# Patient Record
Sex: FEMALE | Employment: OTHER | URBAN - METROPOLITAN AREA
[De-identification: names, ages, dates, MRNs, and addresses within clinical notes are randomized per-mention and may not be internally consistent; named-entity substitution may affect disease eponyms.]

---

## 2017-06-09 NOTE — PATIENT INSTRUCTIONS
ANII: LUCIO WILL BE OUT OF THE CLINIC FROM JAZZ 15 THROUGH JULY 4.  Any calls/Mychart messages, refill requests, and urgent lab results will be forwarded to her colleagues in her absence.

## 2017-06-12 ENCOUNTER — TELEPHONE (OUTPATIENT)
Dept: FAMILY MEDICINE | Facility: CLINIC | Age: 47
End: 2017-06-12

## 2017-06-12 ENCOUNTER — OFFICE VISIT (OUTPATIENT)
Dept: FAMILY MEDICINE | Facility: CLINIC | Age: 47
End: 2017-06-12
Payer: COMMERCIAL

## 2017-06-12 DIAGNOSIS — M54.41 ACUTE MIDLINE LOW BACK PAIN WITH BILATERAL SCIATICA: Primary | ICD-10-CM

## 2017-06-12 DIAGNOSIS — M54.2 CERVICALGIA: ICD-10-CM

## 2017-06-12 DIAGNOSIS — M54.12 CERVICAL RADICULOPATHY: ICD-10-CM

## 2017-06-12 DIAGNOSIS — M54.42 ACUTE MIDLINE LOW BACK PAIN WITH BILATERAL SCIATICA: Primary | ICD-10-CM

## 2017-06-12 PROCEDURE — 99213 OFFICE O/P EST LOW 20 MIN: CPT | Performed by: PHYSICIAN ASSISTANT

## 2017-06-12 NOTE — MR AVS SNAPSHOT
After Visit Summary   6/12/2017    Roya Feliciano    MRN: [id number]           Patient Information     Date Of Birth          1970        Visit Information        Provider Department      6/12/2017 12:00 PM Wanda Rothman PA-C [facility]        Today's Diagnoses     Acute midline low back pain with bilateral sciatica    -  1    Cervicalgia        Cervical radiculopathy          Care Instructions    FYI: WANDA WILL BE OUT OF THE CLINIC FROM JAZZ 15 THROUGH JULY 4.  Any calls/Mychart messages, refill requests, and urgent lab results will be forwarded to her colleagues in her absence.             Follow-ups after your visit        Additional Services     [facility] PT, HAND, AND CHIROPRACTIC REFERRAL       **This order will print in the [facility] Scheduling Office**    Physical Therapy, Hand Therapy and Chiropractic Care are available through:    *[facility]  *[facility]  *[facility]    Call one number to schedule at any of the above locations: [phone].    Your provider has referred you to: Physical Therapy at [facility] or [facility]    Indication/Reason for Referral: Neck and low back pain with radiculopathy  Onset of Illness: 1-2 years  Therapy Orders: Evaluate and Treat  Special Programs: None  Special Request: None    Frantz Landry      Additional Comments for the Therapist or Chiropractor:     Please be aware that coverage of these services is subject to the terms and limitations of your health insurance plan.  Call member services at your health plan with any benefit or coverage questions.      Please bring the following to your appointment:    *Your personal calendar for scheduling future appointments  *Comfortable clothing                  Future tests that were ordered for you today     Open Future Orders        Priority Expected Expires Ordered    XR Lumbar Spine 2/3 Views Routine 6/12/2017 6/12/2018 6/12/2017    XR Cervical Spine  2/3 Views Routine 6/12/2017 6/12/2018 6/12/2017            Who to contact     Normal or non-critical lab and imaging results will be communicated to you by thesixtyonehart, letter or phone within 4 business days after the clinic has received the results. If you do not hear from us within 7 days, please contact the clinic through thesixtyonehart or phone. If you have a critical or abnormal lab result, we will notify you by phone as soon as possible.  Submit refill requests through StyleHop or call your pharmacy and they will forward the refill request to us. Please allow 3 business days for your refill to be completed.          If you need to speak with a  for additional information , please call: [phone]             Additional Information About Your Visit        StyleHop Information     StyleHop gives you secure access to your electronic health record. If you see a primary care provider, you can also send messages to your care team and make appointments. If you have questions, please call your primary care clinic.  If you do not have a primary care provider, please call [phone] and they will assist you.        Care EveryWhere ID     This is your Care EveryWhere ID. This could be used by other organizations to access your [place] medical records  [id number]         Blood Pressure from Last 3 Encounters:   05/20/16 132/83   05/13/16 123/81   01/07/16 126/76    Weight from Last 3 Encounters:   05/20/16 142 lb 3.2 oz (64.5 kg)   05/13/16 142 lb 9.6 oz (64.7 kg)   01/07/16 142 lb 5.1 oz (64.6 kg)              We Performed the Following     [facility] PT, HAND, AND CHIROPRACTIC REFERRAL        Primary Care Provider Office Phone # Fax #    Wanda Rothman PA-C [phone]       [facility] [place] [address] [place] [address]        Thank you!     [facility]. Our goal is always to provide you with excellent care. Hearing back from our patients  is one way we can continue to improve our services. Please take a few minutes to complete the written survey that you may receive in the mail after your visit with us. Thank you!             Your Updated Medication List - Protect others around you: Learn how to safely use, store and throw away your medicines at www.disposemymeds.org.          This list is accurate as of: 6/12/17  2:39 PM.  Always use your most recent med list.                   Brand Name Dispense Instructions for use    amitriptyline 10 MG tablet    ELAVIL    90 tablet    Take 1 tablet (10 mg) by mouth daily       cetirizine 10 MG tablet    zyrTEC    90 tablet    Take 1 tablet (10 mg) by mouth every evening       fluticasone 50 MCG/ACT spray    FLONASE    16 g    Spray 1-2 sprays into both nostrils daily       predniSONE 20 MG tablet    DELTASONE    20 tablet    Take 3 tabs (60 mg) orally daily for 3 days, 2 tabs (40 mg) orally daily for 3 days, 1 tab (20 mg) orally daily for 3 days, then 1/2 tab (10 mg) orally for 3 days

## 2017-06-12 NOTE — Clinical Note
Upon chart review, patient has not yet done x-rays or physical therapy. Will start with these things before obtaining MRIs. Orders/referral placed. Please give patient info to schedule physical therapy and have her come back for XR. Wanda

## 2017-06-12 NOTE — TELEPHONE ENCOUNTER
Upon chart review, patient has not yet done x-rays or physical therapy. Will start with these things before obtaining MRIs. Orders/referral placed. Please give patient info to schedule physical therapy and have her come back for XR.     Left message for patient to call back pod 2 line.([phone])

## 2017-06-12 NOTE — LETTER
[facility] [place]  [address] [place] [id number] [phone]        July 11, 2017    Roya Feliciano  [facility] [place] [address]              Dear Roya Feliciano    After many unsuccessful attempts at reaching you by phone we are writing to inform you that upon chart review, you have not yet completed x-rays or physical therapy. We will start with these things before obtaining an MRI. Orders and referral placed. You can call [phone] to schedule an x-ray. To start physical therapy you may call [phone]. If you have any questions or concerns you can reach me at [phone].       Sincerely,        Wanda Rothman's care team

## 2017-06-23 NOTE — TELEPHONE ENCOUNTER
Left message for patient to call back pod 2 line.([phone]). Will postpone x1 week and if patient does not return call will send letter with below info.

## 2017-08-17 ENCOUNTER — RADIANT APPOINTMENT (OUTPATIENT)
Dept: GENERAL RADIOLOGY | Facility: CLINIC | Age: 47
End: 2017-08-17
Attending: PHYSICIAN ASSISTANT
Payer: COMMERCIAL

## 2017-08-17 ENCOUNTER — OFFICE VISIT (OUTPATIENT)
Dept: FAMILY MEDICINE | Facility: CLINIC | Age: 47
End: 2017-08-17
Payer: COMMERCIAL

## 2017-08-17 VITALS
WEIGHT: 135.2 LBS | HEIGHT: 65 IN | BODY MASS INDEX: 22.53 KG/M2 | SYSTOLIC BLOOD PRESSURE: 123 MMHG | OXYGEN SATURATION: 100 % | DIASTOLIC BLOOD PRESSURE: 79 MMHG | TEMPERATURE: 98.8 F | HEART RATE: 71 BPM

## 2017-08-17 DIAGNOSIS — M54.42 ACUTE MIDLINE LOW BACK PAIN WITH BILATERAL SCIATICA: ICD-10-CM

## 2017-08-17 DIAGNOSIS — Z91.09 ENVIRONMENTAL ALLERGIES: ICD-10-CM

## 2017-08-17 DIAGNOSIS — M54.2 CERVICALGIA: ICD-10-CM

## 2017-08-17 DIAGNOSIS — M54.41 ACUTE MIDLINE LOW BACK PAIN WITH BILATERAL SCIATICA: ICD-10-CM

## 2017-08-17 DIAGNOSIS — M54.12 CERVICAL RADICULOPATHY: ICD-10-CM

## 2017-08-17 DIAGNOSIS — M54.12 CERVICAL RADICULOPATHY: Primary | ICD-10-CM

## 2017-08-17 PROCEDURE — 99213 OFFICE O/P EST LOW 20 MIN: CPT | Performed by: PHYSICIAN ASSISTANT

## 2017-08-17 PROCEDURE — 72100 X-RAY EXAM L-S SPINE 2/3 VWS: CPT

## 2017-08-17 PROCEDURE — 72040 X-RAY EXAM NECK SPINE 2-3 VW: CPT

## 2017-08-17 RX ORDER — PREDNISONE 20 MG/1
TABLET ORAL
Qty: 11 TABLET | Refills: 0 | Status: SHIPPED | OUTPATIENT
Start: 2017-08-17 | End: 2017-08-30

## 2017-08-17 RX ORDER — CETIRIZINE HYDROCHLORIDE 10 MG/1
10 TABLET ORAL EVERY EVENING
Qty: 90 TABLET | Refills: 3 | Status: SHIPPED | OUTPATIENT
Start: 2017-08-17 | End: 2019-03-08

## 2017-08-17 NOTE — NURSING NOTE
"Chief Complaint   Patient presents with     Back Pain     right side, would like medication       Initial /79 (Cuff Size: Adult Regular)  Pulse 71  Temp 98.8  F (37.1  C) (Oral)  Ht 5' 5\" (1.651 m)  Wt 135 lb 3.2 oz (61.3 kg)  LMP 08/01/2017 (Approximate)  SpO2 100%  Breastfeeding? No  BMI 22.5 kg/m2 Estimated body mass index is 22.5 kg/(m^2) as calculated from the following:    Height as of this encounter: 5' 5\" (1.651 m).    Weight as of this encounter: 135 lb 3.2 oz (61.3 kg).  Medication Reconciliation: complete   Estelita MONAHAN CMA ([facility])      "

## 2017-08-17 NOTE — PROGRESS NOTES
SUBJECTIVE:                                                    Roya Feliciano is a 46 year old female who presents to clinic today for the following health issues:      Arm Pain       Duration: 3 days ago        Specific cause: none    Description:   Location of pain: whole right side and arm  Character of pain: sharp and hard to move  Pain radiation:down right leg at times  New numbness or weakness in legs, not attributed to pain:  no     Intensity: Currently 7-8/10    History:   Pain interferes with job: YES  History of back problems: sciatica pain  Any previous MRI or X-rays: None  Sees a specialist for back pain:  Patient went to PT  Therapies tried without relief: Physical Therapy    Alleviating factors:   Improved by: medication she receives      Precipitating factors:  Worsened by: Lifting and using arm to do chores    Functional and Psychosocial Screen (Frantz STarT Back):      Not performed today          Accompanying Signs & Symptoms:  Risk of Fracture:  None  Risk of Cauda Equina:  None  Risk of Infection:  None  Risk of Cancer:  None  Risk of Ankylosing Spondylitis:  Onset at age <35, male, AND morning back stiffness. no           Problem list and histories reviewed & adjusted, as indicated.  Additional history: as documented    Patient Active Problem List   Diagnosis     Subclinical hyperthyroidism     Thyroid nodule     CARDIOVASCULAR SCREENING; LDL GOAL LESS THAN 160     Fibroadenoma of breast     Environmental allergies     Non-toxic multinodular goiter     Past Surgical History:   Procedure Laterality Date     C  DELIVERY ONLY      x2       Social History   Substance Use Topics     Smoking status: Never Smoker     Smokeless tobacco: Never Used     Alcohol use No     Family History   Problem Relation Age of Onset     Family History Negative Other              Reviewed and updated as needed this visit by clinical staffTobacco  Allergies  Meds  Med Hx  Surg Hx  Fam Hx  Soc Hx     "  Reviewed and updated as needed this visit by Provider         ROS:  Constitutional, neuro, musculoskeletal, integument and psychiatric systems are negative, except as otherwise noted.      OBJECTIVE:                                                    /79 (Cuff Size: Adult Regular)  Pulse 71  Temp 98.8  F (37.1  C) (Oral)  Ht 5' 5\" (1.651 m)  Wt 135 lb 3.2 oz (61.3 kg)  LMP 08/01/2017 (Approximate)  SpO2 100%  Breastfeeding? No  BMI 22.5 kg/m2  Body mass index is 22.5 kg/(m^2).  GENERAL APPEARANCE: healthy, alert and no distress  MS: extremities normal- no gross deformities noted  NEURO: Normal strength and tone  PSYCH: mentation appears normal and affect normal/bright       ASSESSMENT:                                                      1. Cervical radiculopathy    2. Environmental allergies         PLAN:                                                    Prednisone B&T for cervical radiculopathy. She has completed physical therapy in the past with no change. X-rays today. Follow up if symptoms fail to improve or worsen.     The patient was in agreement with the plan today and had no questions or concerns prior to leaving the clinic.     Wanda Rothman PA-C  [facility] [place]    "

## 2017-08-17 NOTE — MR AVS SNAPSHOT
"              After Visit Summary   8/17/2017    Roya Feliciano    MRN: [id number]           Patient Information     Date Of Birth          1970        Visit Information        Provider Department      8/17/2017 12:00 PM Wanda Rothman PA-C [facility]        Today's Diagnoses     Cervical radiculopathy    -  1    Environmental allergies          Care Instructions    Discharged by Estelita MONAHAN CMA ([facility])            Follow-ups after your visit        Who to contact     Normal or non-critical lab and imaging results will be communicated to you by BrandBackerhart, letter or phone within 4 business days after the clinic has received the results. If you do not hear from us within 7 days, please contact the clinic through BrandBackerhart or phone. If you have a critical or abnormal lab result, we will notify you by phone as soon as possible.  Submit refill requests through Business Insider or call your pharmacy and they will forward the refill request to us. Please allow 3 business days for your refill to be completed.          If you need to speak with a  for additional information , please call: [phone]             Additional Information About Your Visit        MyCharVendobots Information     Business Insider gives you secure access to your electronic health record. If you see a primary care provider, you can also send messages to your care team and make appointments. If you have questions, please call your primary care clinic.  If you do not have a primary care provider, please call [phone] and they will assist you.        Care EveryWhere ID     This is your Care EveryWhere ID. This could be used by other organizations to access your [place] medical records  [id number]        Your Vitals Were     Pulse Temperature Height Last Period Pulse Oximetry Breastfeeding?    71 98.8  F (37.1  C) (Oral) 5' 5\" (1.651 m) 08/01/2017 (Approximate) 100% No    BMI (Body Mass Index)                   22.5 kg/m2            " Blood Pressure from Last 3 Encounters:   08/17/17 123/79   05/20/16 132/83   05/13/16 123/81    Weight from Last 3 Encounters:   08/17/17 135 lb 3.2 oz (61.3 kg)   05/20/16 142 lb 3.2 oz (64.5 kg)   05/13/16 142 lb 9.6 oz (64.7 kg)              Today, you had the following     No orders found for display         Today's Medication Changes          These changes are accurate as of: 8/17/17 12:17 PM.  If you have any questions, ask your nurse or doctor.               Start taking these medicines.        Dose/Directions    predniSONE 20 MG tablet   Commonly known as:  DELTASONE   Used for:  Cervical radiculopathy   Started by:  Wanda Rothman PA-C        Take 2 tabs (40 mg) daily x 3 days, 1 tab (20 mg) daily x 3 days, then 1/2 tab (10 mg) x 4 days.   Quantity:  11 tablet   Refills:  0            Where to get your medicines      These medications were sent to [facility] [place] [facility] [address] [address] [address]     Phone:  [phone]     cetirizine 10 MG tablet    predniSONE 20 MG tablet                Primary Care Provider Office Phone # Fax #    Wanda Rothman PA-C [phone] [address]  [place] [address]        Equal Access to Services     [facility] AH: Hadii aad ku hadasho Soomaali, waaxda luqadaha, qaybta kaalmada adeegyada, waxay meera haytanmay emmanuel . So [facility] [phone].    ATENCIÓN: Si habla español, tiene a jaeger disposición servicios gratuitos de asistencia lingüística. Llame al [phone].    We comply with applicable federal civil rights laws and [place] laws. We do not discriminate on the basis of race, color, national origin, age, disability sex, sexual orientation or gender identity.            Thank you!     [facility]. Our goal is always to provide you with excellent care. Hearing back from our patients is one way we can continue to  improve our services. Please take a few minutes to complete the written survey that you may receive in the mail after your visit with us. Thank you!             Your Updated Medication List - Protect others around you: Learn how to safely use, store and throw away your medicines at www.disposemymeds.org.          This list is accurate as of: 8/17/17 12:17 PM.  Always use your most recent med list.                   Brand Name Dispense Instructions for use Diagnosis    amitriptyline 10 MG tablet    ELAVIL    90 tablet    Take 1 tablet (10 mg) by mouth daily    Myalgia       cetirizine 10 MG tablet    zyrTEC    90 tablet    Take 1 tablet (10 mg) by mouth every evening    Environmental allergies       fluticasone 50 MCG/ACT spray    FLONASE    16 g    Spray 1-2 sprays into both nostrils daily    Seasonal allergic rhinitis       predniSONE 20 MG tablet    DELTASONE    11 tablet    Take 2 tabs (40 mg) daily x 3 days, 1 tab (20 mg) daily x 3 days, then 1/2 tab (10 mg) x 4 days.    Cervical radiculopathy

## 2017-08-21 ENCOUNTER — OFFICE VISIT (OUTPATIENT)
Dept: FAMILY MEDICINE | Facility: CLINIC | Age: 47
End: 2017-08-21
Payer: COMMERCIAL

## 2017-08-21 VITALS
SYSTOLIC BLOOD PRESSURE: 119 MMHG | HEART RATE: 94 BPM | BODY MASS INDEX: 22.53 KG/M2 | DIASTOLIC BLOOD PRESSURE: 75 MMHG | WEIGHT: 135.4 LBS | TEMPERATURE: 97.6 F

## 2017-08-21 DIAGNOSIS — M54.12 BRACHIAL NEURITIS OR RADICULITIS: Primary | ICD-10-CM

## 2017-08-21 PROCEDURE — 99213 OFFICE O/P EST LOW 20 MIN: CPT | Performed by: PHYSICIAN ASSISTANT

## 2017-08-21 RX ORDER — CYCLOBENZAPRINE HCL 10 MG
5-10 TABLET ORAL 3 TIMES DAILY PRN
Qty: 30 TABLET | Refills: 1 | Status: SHIPPED | OUTPATIENT
Start: 2017-08-21 | End: 2017-08-30

## 2017-08-21 RX ORDER — PREDNISONE 20 MG/1
TABLET ORAL
Qty: 11 TABLET | Refills: 0 | Status: SHIPPED | OUTPATIENT
Start: 2017-08-21 | End: 2017-09-07

## 2017-08-21 NOTE — PATIENT INSTRUCTIONS
Take 2 tabs of prednisone through Friday then decrease the dose to 1 tab daily x4 days, then a half tab daily x4 days.  You can use a half tab of the muscle relaxer in the morning and/or afternoon.  Take 1 tab before bed x1 week.    Call me if no improvements in 1-2 weeks and I'll have you see ortho.

## 2017-08-21 NOTE — PROGRESS NOTES
SUBJECTIVE:   Roya Feliciano is a 46 year old female who presents to clinic today for the following health issues:        Musculoskeletal problem/pain      Duration: 7-8 days     Description  Location: right arm     Intensity:  severe    Accompanying signs and symptoms: none    History  Previous similar problem: no   Previous evaluation:  x-ray    Precipitating or alleviating factors:  Trauma or overuse: no   Aggravating factors include: overuse    Therapies tried and outcome: heat and ice    Was better than days she was taking 40mg prednisone, then when she dropped to 20mg the pain returned          Problem list and histories reviewed & adjusted, as indicated.  Additional history: as documented    Patient Active Problem List   Diagnosis     Subclinical hyperthyroidism     Thyroid nodule     CARDIOVASCULAR SCREENING; LDL GOAL LESS THAN 160     Fibroadenoma of breast     Environmental allergies     Non-toxic multinodular goiter     Past Surgical History:   Procedure Laterality Date     C  DELIVERY ONLY      x2       Social History   Substance Use Topics     Smoking status: Never Smoker     Smokeless tobacco: Never Used     Alcohol use No     Family History   Problem Relation Age of Onset     Family History Negative Other              Reviewed and updated as needed this visit by clinical staffTobacco  Allergies  Meds       Reviewed and updated as needed this visit by Provider         ROS:  Constitutional, neuro, musculoskeletal, integument systems are negative, except as otherwise noted.      OBJECTIVE:                                                    /75  Pulse 94  Temp 97.6  F (36.4  C) (Oral)  Wt 135 lb 6.4 oz (61.4 kg)  LMP 2017 (Approximate)  BMI 22.53 kg/m2  Body mass index is 22.53 kg/(m^2).  GENERAL APPEARANCE: healthy, alert and no distress  MS: extremities normal- no gross deformities noted and return of symptoms with compression of the right brachial plexus  NEURO: Normal  strength and tone  PSYCH: mentation appears normal and affect normal/bright       ASSESSMENT:                                                      1. Brachial neuritis or radiculitis         PLAN:                                                    Will increase her prednisone back to 40mg for a few more days. Will also add a muscle relaxer.     Patient Instructions   Take 2 tabs of prednisone through Friday then decrease the dose to 1 tab daily x4 days, then a half tab daily x4 days.  You can use a half tab of the muscle relaxer in the morning and/or afternoon.  Take 1 tab before bed x1 week.    Call me if no improvements in 1-2 weeks and I'll have you see ortho.       The patient was in agreement with the plan today and had no questions or concerns prior to leaving the clinic.     Wanda Rothman PA-C  [facility]

## 2017-08-21 NOTE — NURSING NOTE
"Chief Complaint   Patient presents with     Back Pain     Sciatica pain       Initial /75  Pulse 94  Temp 97.6  F (36.4  C) (Oral)  Wt 135 lb 6.4 oz (61.4 kg)  LMP 08/01/2017 (Approximate)  BMI 22.53 kg/m2 Estimated body mass index is 22.53 kg/(m^2) as calculated from the following:    Height as of 8/17/17: 5' 5\" (1.651 m).    Weight as of this encounter: 135 lb 6.4 oz (61.4 kg).  Medication Reconciliation: complete       Sri Palafox CMA      "

## 2017-08-21 NOTE — MR AVS SNAPSHOT
After Visit Summary   8/21/2017    Roya Feliciano    MRN: [id number]           Patient Information     Date Of Birth          1970        Visit Information        Provider Department      8/21/2017 10:20 AM Wanda Rothman PA-C [facility]        Today's Diagnoses     Brachial neuritis or radiculitis    -  1      Care Instructions    Take 2 tabs of prednisone through Friday then decrease the dose to 1 tab daily x4 days, then a half tab daily x4 days.  You can use a half tab of the muscle relaxer in the morning and/or afternoon.  Take 1 tab before bed x1 week.    Call me if no improvements in 1-2 weeks and I'll have you see ortho.          Follow-ups after your visit        Who to contact     Normal or non-critical lab and imaging results will be communicated to you by [employer], letter or phone within 4 business days after the clinic has received the results. If you do not hear from us within 7 days, please contact the clinic through [employer] or phone. If you have a critical or abnormal lab result, we will notify you by phone as soon as possible.  Submit refill requests through [employer] or call your pharmacy and they will forward the refill request to us. Please allow 3 business days for your refill to be completed.          If you need to speak with a  for additional information , please call: [phone]             Additional Information About Your Visit        [employer] Smart[employer] Physical Information     [employer] gives you secure access to your electronic health record. If you see a primary care provider, you can also send messages to your care team and make appointments. If you have questions, please call your primary care clinic.  If you do not have a primary care provider, please call [phone] and they will assist you.        Care EveryWhere ID     This is your Care EveryWhere ID. This could be used by other organizations to access your [facility]  records  [id number]        Your Vitals Were     Pulse Temperature Last Period BMI (Body Mass Index)          94 97.6  F (36.4  C) (Oral) 08/01/2017 (Approximate) 22.53 kg/m2         Blood Pressure from Last 3 Encounters:   08/21/17 119/75   08/17/17 123/79   05/20/16 132/83    Weight from Last 3 Encounters:   08/21/17 135 lb 6.4 oz (61.4 kg)   08/17/17 135 lb 3.2 oz (61.3 kg)   05/20/16 142 lb 3.2 oz (64.5 kg)              Today, you had the following     No orders found for display         Today's Medication Changes          These changes are accurate as of: 8/21/17 10:37 AM.  If you have any questions, ask your nurse or doctor.               Start taking these medicines.        Dose/Directions    cyclobenzaprine 10 MG tablet   Commonly known as:  FLEXERIL   Used for:  Brachial neuritis or radiculitis   Started by:  Wanda Rothman PA-C        Dose:  5-10 mg   Take 0.5-1 tablets (5-10 mg) by mouth 3 times daily as needed for muscle spasms (and tightness)   Quantity:  30 tablet   Refills:  1         These medicines have changed or have updated prescriptions.        Dose/Directions    * predniSONE 20 MG tablet   Commonly known as:  DELTASONE   This may have changed:  Another medication with the same name was added. Make sure you understand how and when to take each.   Used for:  Cervical radiculopathy   Changed by:  Wanda Rothman PA-C        Take 2 tabs (40 mg) daily x 3 days, 1 tab (20 mg) daily x 3 days, then 1/2 tab (10 mg) x 4 days.   Quantity:  11 tablet   Refills:  0       * predniSONE 20 MG tablet   Commonly known as:  DELTASONE   This may have changed:  You were already taking a medication with the same name, and this prescription was added. Make sure you understand how and when to take each.   Used for:  Brachial neuritis or radiculitis   Changed by:  Wanda Rothman PA-C        Take 2 tabs (40 mg) daily x 5 days, 1 tab (20 mg) daily x 4 days, then 1/2 tab (10 mg) x 4 days.   Quantity:   11 tablet   Refills:  0       * Notice:  This list has 2 medication(s) that are the same as other medications prescribed for you. Read the directions carefully, and ask your doctor or other care provider to review them with you.         Where to get your medicines      These medications were sent to [facility] [place] - [address] [address]  [address] [address] [address]     Phone:  [phone]     cyclobenzaprine 10 MG tablet    predniSONE 20 MG tablet                Primary Care Provider Office Phone # Fax #    Wanda Rothman PA-C [phone]       [address]  [place] [address]        Equal Access to Services     DONNA SAEED : Hadii nicola graves hadasho Soomaali, waaxda luqadaha, qaybta kaalmada adeegyada, alivia emmanuel . So [facility] [phone].    ATENCIÓN: Si habla español, tiene a jaeger disposición servicios gratuitos de asistencia lingüística. Llame al [phone].    We comply with applicable federal civil rights laws and [place] laws. We do not discriminate on the basis of race, color, national origin, age, disability sex, sexual orientation or gender identity.            Thank you!     Thank you for choosing [facility]  for your care. Our goal is always to provide you with excellent care. Hearing back from our patients is one way we can continue to improve our services. Please take a few minutes to complete the written survey that you may receive in the mail after your visit with us. Thank you!             Your Updated Medication List - Protect others around you: Learn how to safely use, store and throw away your medicines at www.disposemymeds.org.          This list is accurate as of: 8/21/17 10:37 AM.  Always use your most recent med list.                   Brand Name Dispense Instructions for use Diagnosis    amitriptyline 10 MG tablet    ELAVIL    90 tablet    Take 1 tablet (10 mg) by mouth daily    Myalgia        cetirizine 10 MG tablet    zyrTEC    90 tablet    Take 1 tablet (10 mg) by mouth every evening    Environmental allergies       cyclobenzaprine 10 MG tablet    FLEXERIL    30 tablet    Take 0.5-1 tablets (5-10 mg) by mouth 3 times daily as needed for muscle spasms (and tightness)    Brachial neuritis or radiculitis       fluticasone 50 MCG/ACT spray    FLONASE    16 g    Spray 1-2 sprays into both nostrils daily    Seasonal allergic rhinitis       * predniSONE 20 MG tablet    DELTASONE    11 tablet    Take 2 tabs (40 mg) daily x 3 days, 1 tab (20 mg) daily x 3 days, then 1/2 tab (10 mg) x 4 days.    Cervical radiculopathy       * predniSONE 20 MG tablet    DELTASONE    11 tablet    Take 2 tabs (40 mg) daily x 5 days, 1 tab (20 mg) daily x 4 days, then 1/2 tab (10 mg) x 4 days.    Brachial neuritis or radiculitis       * Notice:  This list has 2 medication(s) that are the same as other medications prescribed for you. Read the directions carefully, and ask your doctor or other care provider to review them with you.

## 2017-08-25 ENCOUNTER — TELEPHONE (OUTPATIENT)
Dept: FAMILY MEDICINE | Facility: CLINIC | Age: 47
End: 2017-08-25

## 2017-08-25 DIAGNOSIS — M54.12 BRACHIAL NEURITIS OR RADICULITIS: Primary | ICD-10-CM

## 2017-08-25 NOTE — TELEPHONE ENCOUNTER
Yoli states that you said you would refer patient to a specialist and she still has a pinched nerve in her arm.  Please call with name and number.    Thank you.

## 2017-08-25 NOTE — TELEPHONE ENCOUNTER
Instructions   Take 2 tabs of prednisone through Friday then decrease the dose to 1 tab daily x4 days, then a half tab daily x4 days.  You can use a half tab of the muscle relaxer in the morning and/or afternoon.  Take 1 tab before bed x1 week.     Call me if no improvements in 1-2 weeks and I'll have you see ortho.     Referral to Ortho pended for approval

## 2017-08-30 ENCOUNTER — OFFICE VISIT (OUTPATIENT)
Dept: FAMILY MEDICINE | Facility: CLINIC | Age: 47
End: 2017-08-30
Payer: COMMERCIAL

## 2017-08-30 VITALS
WEIGHT: 138 LBS | SYSTOLIC BLOOD PRESSURE: 126 MMHG | HEART RATE: 111 BPM | BODY MASS INDEX: 22.96 KG/M2 | DIASTOLIC BLOOD PRESSURE: 82 MMHG | TEMPERATURE: 97.7 F

## 2017-08-30 DIAGNOSIS — M54.12 BRACHIAL NEURITIS OR RADICULITIS: ICD-10-CM

## 2017-08-30 PROCEDURE — 99213 OFFICE O/P EST LOW 20 MIN: CPT | Performed by: PHYSICIAN ASSISTANT

## 2017-08-30 RX ORDER — CYCLOBENZAPRINE HCL 10 MG
5-10 TABLET ORAL 3 TIMES DAILY PRN
Qty: 30 TABLET | Refills: 3 | Status: SHIPPED | OUTPATIENT
Start: 2017-08-30 | End: 2019-03-08

## 2017-08-30 NOTE — PROGRESS NOTES
SUBJECTIVE:   Roya Feliciano is a 46 year old female who presents to clinic today for the following health issues:    Patient would like a refill on the flexeril for the shooting pains she gets down her right arm. Patient states that the medication does help but does not want to run out. Patients is to schedule an MRI and then follow up with a specialist for the pain.       Problem list and histories reviewed & adjusted, as indicated.  Additional history: as documented    Patient Active Problem List   Diagnosis     Subclinical hyperthyroidism     Thyroid nodule     CARDIOVASCULAR SCREENING; LDL GOAL LESS THAN 160     Fibroadenoma of breast     Environmental allergies     Non-toxic multinodular goiter     Past Surgical History:   Procedure Laterality Date     C  DELIVERY ONLY      x2       Social History   Substance Use Topics     Smoking status: Never Smoker     Smokeless tobacco: Never Used     Alcohol use No     Family History   Problem Relation Age of Onset     Family History Negative Other              Reviewed and updated as needed this visit by clinical staff  Tobacco  Allergies  Meds       Reviewed and updated as needed this visit by Provider         ROS:  Constitutional, neuro, musculoskeletal systems are negative, except as otherwise noted.      OBJECTIVE:                                                    /82  Pulse 111  Temp 97.7  F (36.5  C) (Oral)  Wt 138 lb (62.6 kg)  LMP 2017 (Approximate)  BMI 22.96 kg/m2  Body mass index is 22.96 kg/(m^2).  GENERAL APPEARANCE: healthy, alert and no distress  MS: extremities normal- no gross deformities noted  PSYCH: mentation appears normal and affect normal/bright       ASSESSMENT:                                                      1. Brachial neuritis or radiculitis         PLAN:                                                    Flexeril renewed. I had placed a referral for the patient last week; however, she never got the message.  Info given to patient to schedule.    Patient Instructions   Orthopedics at [place]: [phone]       The patient was in agreement with the plan today and had no questions or concerns prior to leaving the clinic.     Wanda Rothman PA-C  [facility]

## 2017-08-30 NOTE — NURSING NOTE
"Chief Complaint   Patient presents with     Recheck Medication     Right arm pain        Initial /82  Pulse 111  Temp 97.7  F (36.5  C) (Oral)  Wt 138 lb (62.6 kg)  LMP 08/01/2017 (Approximate)  BMI 22.96 kg/m2 Estimated body mass index is 22.96 kg/(m^2) as calculated from the following:    Height as of 8/17/17: 5' 5\" (1.651 m).    Weight as of this encounter: 138 lb (62.6 kg).  Medication Reconciliation: complete         Sri Palafox CMA      "

## 2017-08-30 NOTE — MR AVS SNAPSHOT
After Visit Summary   8/30/2017    Roya Feliciano    MRN: [id number]           Patient Information     Date Of Birth          1970        Visit Information        Provider Department      8/30/2017 10:20 AM Wanda Rothman PA-C [facility]        Today's Diagnoses     Brachial neuritis or radiculitis          Care Instructions    Orthopedics at [place]: [phone]            Follow-ups after your visit        Who to contact     Normal or non-critical lab and imaging results will be communicated to you by [employer], letter or phone within 4 business days after the clinic has received the results. If you do not hear from us within 7 days, please contact the clinic through [employer] or phone. If you have a critical or abnormal lab result, we will notify you by phone as soon as possible.  Submit refill requests through [employer] or call your pharmacy and they will forward the refill request to us. Please allow 3 business days for your refill to be completed.          If you need to speak with a  for additional information , please call: [phone]             Additional Information About Your Visit        MyChart Information     [employer] gives you secure access to your electronic health record. If you see a primary care provider, you can also send messages to your care team and make appointments. If you have questions, please call your primary care clinic.  If you do not have a primary care provider, please call [phone] and they will assist you.        Care EveryWhere ID     This is your Care EveryWhere ID. This could be used by other organizations to access your [place] medical records  [id number]        Your Vitals Were     Pulse Temperature Last Period BMI (Body Mass Index)          111 97.7  F (36.5  C) (Oral) 08/01/2017 (Approximate) 22.96 kg/m2         Blood Pressure from Last 3 Encounters:   08/30/17 126/82   08/21/17 119/75   08/17/17 123/79    Weight  from Last 3 Encounters:   08/30/17 138 lb (62.6 kg)   08/21/17 135 lb 6.4 oz (61.4 kg)   08/17/17 135 lb 3.2 oz (61.3 kg)              Today, you had the following     No orders found for display         Today's Medication Changes          These changes are accurate as of: 8/30/17 10:34 AM.  If you have any questions, ask your nurse or doctor.               These medicines have changed or have updated prescriptions.        Dose/Directions    predniSONE 20 MG tablet   Commonly known as:  DELTASONE   This may have changed:  Another medication with the same name was removed. Continue taking this medication, and follow the directions you see here.   Used for:  Brachial neuritis or radiculitis   Changed by:  Wanda Rothman PA-C        Take 2 tabs (40 mg) daily x 5 days, 1 tab (20 mg) daily x 4 days, then 1/2 tab (10 mg) x 4 days.   Quantity:  11 tablet   Refills:  0         Stop taking these medicines if you haven't already. Please contact your care team if you have questions.     amitriptyline 10 MG tablet   Commonly known as:  ELAVIL   Stopped by:  Wanda Rothman PA-C                Where to get your medicines      These medications were sent to [facility] [place] - [address]  [address] [address] [address]     Phone:  [phone]     cyclobenzaprine 10 MG tablet                Primary Care Provider Office Phone # Fax #    Wanda Rothman PA-C [phone] [address] [address]  [place] [address]        Equal Access to Services     [facility] AH: Hadii aad ku hadasho Soomaali, waaxda luqadaha, qaybta kaalmada adeegyada, waxzina oropeza. So [facility] [phone].    ATENCIÓN: Si habla español, tiene a jaeger disposición servicios gratuitos de asistencia lingüística. Llame al [phone].    We comply with applicable federal civil rights laws and [place] laws. We do not discriminate on the basis of race, color, national  origin, age, disability sex, sexual orientation or gender identity.            Thank you!     Thank you for choosing [facility]  for your care. Our goal is always to provide you with excellent care. Hearing back from our patients is one way we can continue to improve our services. Please take a few minutes to complete the written survey that you may receive in the mail after your visit with us. Thank you!             Your Updated Medication List - Protect others around you: Learn how to safely use, store and throw away your medicines at www.disposemymeds.org.          This list is accurate as of: 8/30/17 10:34 AM.  Always use your most recent med list.                   Brand Name Dispense Instructions for use Diagnosis    cetirizine 10 MG tablet    zyrTEC    90 tablet    Take 1 tablet (10 mg) by mouth every evening    Environmental allergies       cyclobenzaprine 10 MG tablet    FLEXERIL    30 tablet    Take 0.5-1 tablets (5-10 mg) by mouth 3 times daily as needed for muscle spasms (and tightness)    Brachial neuritis or radiculitis       fluticasone 50 MCG/ACT spray    FLONASE    16 g    Spray 1-2 sprays into both nostrils daily    Seasonal allergic rhinitis       predniSONE 20 MG tablet    DELTASONE    11 tablet    Take 2 tabs (40 mg) daily x 5 days, 1 tab (20 mg) daily x 4 days, then 1/2 tab (10 mg) x 4 days.    Brachial neuritis or radiculitis

## 2017-09-07 ENCOUNTER — OFFICE VISIT (OUTPATIENT)
Dept: ORTHOPEDICS | Facility: CLINIC | Age: 47
End: 2017-09-07
Payer: COMMERCIAL

## 2017-09-07 VITALS
HEIGHT: 65 IN | SYSTOLIC BLOOD PRESSURE: 126 MMHG | DIASTOLIC BLOOD PRESSURE: 82 MMHG | WEIGHT: 138 LBS | BODY MASS INDEX: 22.99 KG/M2

## 2017-09-07 DIAGNOSIS — M54.12 CERVICAL RADICULOPATHY: Primary | ICD-10-CM

## 2017-09-07 PROCEDURE — 99243 OFF/OP CNSLTJ NEW/EST LOW 30: CPT | Performed by: PEDIATRICS

## 2017-09-07 NOTE — MR AVS SNAPSHOT
After Visit Summary   9/7/2017    Roya Feliciano    MRN: [id number]           Patient Information     Date Of Birth          1970        Visit Information        Provider Department      9/7/2017 10:40 AM Giovanna Smith MD [facility] [place]        Today's Diagnoses     Cervical radiculopathy    -  1      Care Instructions    Plan:  - Today's Plan of Care:  physical therapy referal    -We also discussed other future treatment options:  MRI of the neck    Follow Up: 4-6 weeks              Follow-ups after your visit        Additional Services     [facility] PT, HAND, AND CHIROPRACTIC REFERRAL       **This order will print in the [facility] Scheduling Office**    Physical Therapy, Hand Therapy and Chiropractic Care are available through:    *[facility]  *[facility]  *[facility]    Call one number to schedule at any of the above locations: [phone].    Your provider has referred you to: Physical Therapy at [facility] or [facility]    Indication/Reason for Referral: Neck Pain  Onset of Illness: 2 weeks  Therapy Orders: Evaluate and Treat  Special Programs: None  Special Request: None    Frantz Landry      Additional Comments for the Therapist or Chiropractor: na    Please be aware that coverage of these services is subject to the terms and limitations of your health insurance plan.  Call member services at your health plan with any benefit or coverage questions.      Please bring the following to your appointment:    *Your personal calendar for scheduling future appointments  *Comfortable clothing                  Follow-up notes from your care team     Return in about 4 weeks (around 10/5/2017).      Who to contact     If you have questions or need follow up information about today's clinic visit or your schedule please contact [facility] [place] directly at [phone].  Normal or non-critical lab and imaging  "results will be communicated to you by MyChart, letter or phone within 4 business days after the clinic has received the results. If you do not hear from us within 7 days, please contact the clinic through [employer] or phone. If you have a critical or abnormal lab result, we will notify you by phone as soon as possible.  Submit refill requests through [employer] or call your pharmacy and they will forward the refill request to us. Please allow 3 business days for your refill to be completed.          Additional Information About Your Visit        [employer] Information     [employer] gives you secure access to your electronic health record. If you see a primary care provider, you can also send messages to your care team and make appointments. If you have questions, please call your primary care clinic.  If you do not have a primary care provider, please call [phone] and they will assist you.        Care EveryWhere ID     This is your Care EveryWhere ID. This could be used by other organizations to access your [place] medical records  [id number]        Your Vitals Were     Height Last Period BMI (Body Mass Index)             5' 5\" (1.651 m) 08/01/2017 (Approximate) 22.96 kg/m2          Blood Pressure from Last 3 Encounters:   09/07/17 126/82   08/30/17 126/82   08/21/17 119/75    Weight from Last 3 Encounters:   09/07/17 138 lb (62.6 kg)   08/30/17 138 lb (62.6 kg)   08/21/17 135 lb 6.4 oz (61.4 kg)              We Performed the Following     [facility] PT, HAND, AND CHIROPRACTIC REFERRAL        Primary Care Provider Office Phone # Fax #    Wanda Rothman PA-C [phone] [address]  [place] [address]        Equal Access to Services     [facility] : Abby Del Cid, oli gross, yariel wooalmada alex, alivia oropeza. So [facility] [place] [phone].    ATENCIÓN: Si habla español, tiene a jaeger disposición servicios gratuitos de asistencia lingüística. Llame " al [phone].    We comply with applicable federal civil rights laws and [place] laws. We do not discriminate on the basis of race, color, national origin, age, disability sex, sexual orientation or gender identity.            Thank you!     Thank you for choosing [facility]  for your care. Our goal is always to provide you with excellent care. Hearing back from our patients is one way we can continue to improve our services. Please take a few minutes to complete the written survey that you may receive in the mail after your visit with us. Thank you!             Your Updated Medication List - Protect others around you: Learn how to safely use, store and throw away your medicines at www.disposemymeds.org.          This list is accurate as of: 9/7/17 11:05 AM.  Always use your most recent med list.                   Brand Name Dispense Instructions for use Diagnosis    cetirizine 10 MG tablet    zyrTEC    90 tablet    Take 1 tablet (10 mg) by mouth every evening    Environmental allergies       cyclobenzaprine 10 MG tablet    FLEXERIL    30 tablet    Take 0.5-1 tablets (5-10 mg) by mouth 3 times daily as needed for muscle spasms (and tightness)    Brachial neuritis or radiculitis       fluticasone 50 MCG/ACT spray    FLONASE    16 g    Spray 1-2 sprays into both nostrils daily    Seasonal allergic rhinitis

## 2017-09-07 NOTE — PROGRESS NOTES
Sports Medicine Clinic Visit    PCP: Wanda Rothman    Roya Feliciano is a 46 year old female who is seen in consultation at the request of  Wanda Rothman M.D. presenting with right arm pain.  - Patient is seen with her daughter who translates    Injury: Patient reports insidous onset of neck and right arm pain without any acute trauma around 2 weeks ago.  Pain was in her right neck and into her fingers.  Patient was put on prednisone and flexeril and her pain is much improved.  She has only mild pain down her arm, no weakness, no numbness or tingling    Location of Pain: right lateral neck and mid arm  Duration of Pain: 2 week(s)  Rating of Pain at worst: 8/10  Rating of Pain Currently: 1/10  Symptoms are better with: Other medications: flexeril and prednisone  Symptoms are worse with: overhead motions  Additional Features:   Positive: none   Negative: swelling, bruising, popping, catching, locking, instability, paresthesias and numbness  Other evaluation and/or treatments so far consists of: Ice and Other medications: see above  Prior History of related problems: sciatica     Social History: not working    Review of Systems  Skin: no bruising, no swelling  Musculoskeletal: as above  Neurologic: no numbness, paresthesias  Remainder of review of systems is negative including constitutional, CV, pulmonary, GI, except as noted in HPI or medical history.    Patient's current problem list, past medical and surgical history, and family history were reviewed.    Patient Active Problem List   Diagnosis     Subclinical hyperthyroidism     Thyroid nodule     CARDIOVASCULAR SCREENING; LDL GOAL LESS THAN 160     Fibroadenoma of breast     Environmental allergies     Non-toxic multinodular goiter     Past Medical History:   Diagnosis Date     Anemia      Latent tuberculosis infection      Past Surgical History:   Procedure Laterality Date     C  DELIVERY ONLY      x2     Family History   Problem Relation  "Age of Onset     Family History Negative Other          Objective  /82  Ht 5' 5\" (1.651 m)  Wt 138 lb (62.6 kg)  LMP 08/01/2017 (Approximate)  BMI 22.96 kg/m2    GENERAL APPEARANCE: healthy, alert and no distress   GAIT: NORMAL  SKIN: no suspicious lesions or rashes  HEENT: Sclera clear, anicteric  CV: good peripheral pulses  RESP: Breathing not labored  NEURO: Normal strength and tone, mentation intact and speech normal  PSYCH:  mentation appears normal and affect normal/bright    Cervical Spine Exam    Inspection:       No visible deformity        normal lordotic curvature maintained    Posture:      rounded shoulders and upper back    Tender:      None currently    Non-Tender:      remainder of cervical spine area    Range of Motion:       Full active and passive ROM forward flexion, extension, lateral rotation, lateral flexion.    Painful Motions:      none    Strength:     C4 (shoulder shrug)  symmetric 5/5       C5 (shoulder abduction) symmetric 5/5       C6 (elbow flexion) symmetric 5/5       C7 (elbow extension) symmetric 5/5       C8 (finger abduction, thumb flexion) symmetric 5/5    Reflexes:      C5 (biceps) symmetric normal       C6 (supinator) symmetric normal       C7 (triceps) symmetric normal    Sensation:     grossly intact througout bilateral upper extremities    Special Tests:      neg (-) Spurling    Skin:     well perfused       capillary refill brisk    Lymphatics:      no edema noted in the upper extremities       Radiology  I visualized and reviewed these images with the patient  CERVICAL SPINE TWO/THREE VIEWS 8/17/2017 1:41 PM   HISTORY: Intermittent cervicalgia. Cervicalgia. Radiculopathy,  cervical region.  COMPARISON: None  IMPRESSION: The cervical spine demonstrates normal sagittal alignment.  Vertebral body heights and intervertebral disc spaces are maintained.  The prevertebral soft tissues are unremarkable.    Assessment:  1. Cervical radiculopathy      Cervical " radiculopathy, improved with medications.  We discussed physical therapy.  Would consider further work up like MRI pending clinical course.    Plan:  - Today's Plan of Care:  physical therapy referal    -We also discussed other future treatment options:  Cervical MRI    Follow Up: 4-6 weeks    Concerning signs and symptoms were reviewed.  The patient expressed understanding of this management plan and all questions were answered at this time.    Thanks for the opportunity to participate in the care of this patient, I will keep you updated on their progress.    CC:  Wanda Smith MD CAQ  Primary Care Sports Medicine  [facility]

## 2017-09-07 NOTE — NURSING NOTE
"Chief Complaint   Patient presents with     Musculoskeletal Problem     right arm       Initial /82  Ht 5' 5\" (1.651 m)  Wt 138 lb (62.6 kg)  LMP 08/01/2017 (Approximate)  BMI 22.96 kg/m2 Estimated body mass index is 22.96 kg/(m^2) as calculated from the following:    Height as of this encounter: 5' 5\" (1.651 m).    Weight as of this encounter: 138 lb (62.6 kg).  Medication Reconciliation: complete    "

## 2017-09-07 NOTE — PATIENT INSTRUCTIONS
Plan:  - Today's Plan of Care:  physical therapy referal    -We also discussed other future treatment options:  MRI of the neck    Follow Up: 4-6 weeks

## 2017-09-28 ENCOUNTER — THERAPY VISIT (OUTPATIENT)
Dept: PHYSICAL THERAPY | Facility: CLINIC | Age: 47
End: 2017-09-28
Payer: COMMERCIAL

## 2017-09-28 DIAGNOSIS — M54.2 CERVICALGIA: ICD-10-CM

## 2017-09-28 DIAGNOSIS — M25.511 RIGHT SHOULDER PAIN, UNSPECIFIED CHRONICITY: Primary | ICD-10-CM

## 2017-09-28 PROCEDURE — G8991 OTHER PT/OT GOAL STATUS: HCPCS | Mod: GP | Performed by: PHYSICAL THERAPIST

## 2017-09-28 PROCEDURE — G8990 OTHER PT/OT CURRENT STATUS: HCPCS | Mod: GP | Performed by: PHYSICAL THERAPIST

## 2017-09-28 PROCEDURE — 97110 THERAPEUTIC EXERCISES: CPT | Mod: GP | Performed by: PHYSICAL THERAPIST

## 2017-09-28 PROCEDURE — 97161 PT EVAL LOW COMPLEX 20 MIN: CPT | Mod: GP | Performed by: PHYSICAL THERAPIST

## 2017-09-28 NOTE — PROGRESS NOTES
[facility] Initial Evaluation    Subjective:    Patient is a 46 year old female presenting with rehab cervical spine hpi. The history is provided by the patient and a relative. A  was used (daughter Yoli).   Affected Side: right UE.  Condition occurred with:  Insidious onset.  Condition occurred: for unknown reasons.  This is a chronic condition  Noticed gradual onset of right arm and neck pain approximately 2 months ago.  Patient was referred to PT 9/07/17.    Site of Pain: Right upper arm, forearm, and hand.  Radiates to:  Other (right side of neck occasionally).  Pain is described as sharp and is intermittent and reported as 1/10.  Associated symptoms:  Loss of motion/stiffness. Pain is the same all the time.  Exacerbated by: doing housework. and relieved by muscle relaxants.  Since onset symptoms are gradually worsening.  Special tests:  X-ray.      General health as reported by patient is good.  Pertinent medical history includes:  None.  Medical allergies: no.  Other surgeries include:  No.  Current medications:  Muscle relaxants, anti-inflammatory and pain medication.  Current occupation is None.    Employment tasks: general housework.    Barriers include:  None as reported by the patient.    Red flags:  None as reported by the patient.                        Objective:    Standing Alignment:    Cervical/Thoracic:  Forward head and thoracic kyphosis increased  Shoulder/UE:  Rounded shoulders                                  Cervical/Thoracic Evaluation    AROM:  AROM Cervical:    Flexion:            WNL  Extension:       Mild loss  Rotation:         Left: MIld loss     Right: Mild loss  Side Bend:      Left: Mild loss     Right:  Mild loss      Headaches: none  Cervical Myotomes:  normal                  DTR's:  normal            Cervical Palpation:  normal        Cervical Stability/Joint Clearing:        Right negative at:  Provocation (Spurling's)            Shoulder Evaluation:  ROM:  AROM:    Flexion:  Right:  115 (+)    Abduction:  Right:  108      External Rotation:  Right:  72 (+)            Extension/Internal Rotation:  Right:  T12    PROM:    Flexion:  Left:  WNL    Right: 135 (+)    Extension:  Left:  WNL    Right:  125 (+)      Internal Rotation:  Left:  60    Right:  15 (+)  External Rotation:  Left:  WNL    Right:  80 (+)                  Endfeel: firm end feel with all PROM  Strength:  normal                            Mobility Tests:      Glenohumeral posterior left:  Hypomobile    Glenohumeral inferior left:  Hypomobile                                             General     ROS    Assessment/Plan:    Patient is a 46 year old female with right UE complaints.  Exam findings reveal a non-capsular pattern of limitation in the right shoulder that is likely contributing to her right UE pain with household chores.  Given negative C-spine exam today and my inability to reproduce neck pain, I suspect neck complaints may be compensatory in nature and stemming from right upper trap compensation with reaching activities. Patient will benefit from mobilization and stretching to the glenohumeral joint.    Patient has the following significant findings with corresponding treatment plan.                Diagnosis 1:  Neck Pain   Pain -  self management, education and home program  Decreased ROM/flexibility - manual therapy and therapeutic exercise  Decreased joint mobility - manual therapy and therapeutic exercise  Decreased function - home program  Impaired posture - neuro re-education    Therapy Evaluation Codes:   1) History comprised of:   Personal factors that impact the plan of care:      Cognition and Language.    Comorbidity factors that impact the plan of care are:      None.     Medications impacting care: Muscle relaxant.  2) Examination of Body Systems comprised of:   Body structures and functions that impact the plan of care:      Cervical spine and  Shoulder.   Activity limitations that impact the plan of care are:      reaching.  3) Clinical presentation characteristics are:   Stable/Uncomplicated.  4) Decision-Making    Low complexity using standardized patient assessment instrument and/or measureable assessment of functional outcome.  Cumulative Therapy Evaluation is: Low complexity.    Previous and current functional limitations:  (See Goal Flow Sheet for this information)    Short term and Long term goals: (See Goal Flow Sheet for this information)     Communication ability:  Patient has an  for communication clarity; however, also appears to have some cognitive difficulties following verbal instructions.  Treatment Explanation - The following has been discussed with the patient:     RX ordered/plan of care  Anticipated outcomes  Possible risks and side effects  This patient would benefit from PT intervention to resume normal activities.   Rehab potential is fair.    Frequency:  1 X week, once daily  Duration:  for 6 weeks  Discharge Plan:  Achieve all LTG.  Independent in home treatment program.  Reach maximal therapeutic benefit.    Please refer to the daily flowsheet for treatment today, total treatment time and time spent performing 1:1 timed codes.

## 2017-09-28 NOTE — MR AVS SNAPSHOT
After Visit Summary   9/28/2017    Roya Feliciano    MRN: [id number]           Patient Information     Date Of Birth          1970        Visit Information        Provider Department      9/28/2017 11:20 AM Katelyn Velasquez PT [facility] [place]        Today's Diagnoses     Right shoulder pain, unspecified chronicity    -  1    Cervicalgia           Follow-ups after your visit        Who to contact     If you have questions or need follow up information about today's clinic visit or your schedule please contact [facility] [place] [facility] directly at [phone].  Normal or non-critical lab and imaging results will be communicated to you by [employer] EXIMhart, letter or phone within 4 business days after the clinic has received the results. If you do not hear from us within 7 days, please contact the clinic through ITT EXIMhart or phone. If you have a critical or abnormal lab result, we will notify you by phone as soon as possible.  Submit refill requests through [employer] or call your pharmacy and they will forward the refill request to us. Please allow 3 business days for your refill to be completed.          Additional Information About Your Visit        MyChart Information     [employer] gives you secure access to your electronic health record. If you see a primary care provider, you can also send messages to your care team and make appointments. If you have questions, please call your primary care clinic.  If you do not have a primary care provider, please call [phone] and they will assist you.        Care EveryWhere ID     This is your Care EveryWhere ID. This could be used by other organizations to access your [facility] medical records  [id number]         Blood Pressure from Last 3 Encounters:   09/07/17 126/82   08/30/17 126/82   08/21/17 119/75    Weight from Last 3 Encounters:   09/07/17 62.6 kg (138 lb)   08/30/17 62.6 kg (138 lb)   08/21/17 61.4 kg (135 lb 6.4  oz)              We Performed the Following     HC PT EVAL, LOW COMPLEXITY     TOVA INITIAL EVAL REPORT     THERAPEUTIC EXERCISES        Primary Care Provider Office Phone # Fax #    Wanda Rothman PA-C [phone] [address] [place] [address]        Equal Access to Services     [facility]: Hadii aad ku hadasho Soomaali, waaxda luqadaha, qaybta kaalmada adeegyada, waxay idiin hayaan adeeg kharash la'aan . So [facility] [phone].    ATENCIÓN: Si habla español, tiene a jaeger disposición servicios gratuitos de asistencia lingüística. [facility] [phone].    We comply with applicable federal civil rights laws and [place] laws. We do not discriminate on the basis of race, color, national origin, age, disability sex, sexual orientation or gender identity.            Thank you!     Thank you for choosing [facility]  for your care. Our goal is always to provide you with excellent care. Hearing back from our patients is one way we can continue to improve our services. Please take a few minutes to complete the written survey that you may receive in the mail after your visit with us. Thank you!             Your Updated Medication List - Protect others around you: Learn how to safely use, store and throw away your medicines at www.disposemymeds.org.          This list is accurate as of: 9/28/17  5:42 PM.  Always use your most recent med list.                   Brand Name Dispense Instructions for use Diagnosis    cetirizine 10 MG tablet    zyrTEC    90 tablet    Take 1 tablet (10 mg) by mouth every evening    Environmental allergies       cyclobenzaprine 10 MG tablet    FLEXERIL    30 tablet    Take 0.5-1 tablets (5-10 mg) by mouth 3 times daily as needed for muscle spasms (and tightness)    Brachial neuritis or radiculitis       fluticasone 50 MCG/ACT spray    FLONASE    16 g    Spray 1-2 sprays into both nostrils daily    Seasonal allergic rhinitis

## 2017-09-28 NOTE — Clinical Note
Dr. Smith, I saw this patient today and would like you to review my eval and assessment when you get a chance.  Arm pain seems to be more shoulder related than neck.  C spine exam was completely negative today. Just wondering if you need to see her back for follow up first or if I am OK to continue treating her limited shoulder mobility. For billing purposes, may just need to get new order for shoulder pain if you approve.  Let me know either way and I will proceed according to your plan.  Thx! Katelyn

## 2017-09-30 DIAGNOSIS — M25.511 ACUTE PAIN OF RIGHT SHOULDER: Primary | ICD-10-CM

## 2017-12-29 ENCOUNTER — THERAPY VISIT (OUTPATIENT)
Dept: PHYSICAL THERAPY | Facility: CLINIC | Age: 47
End: 2017-12-29
Payer: COMMERCIAL

## 2017-12-29 DIAGNOSIS — M54.2 CERVICALGIA: ICD-10-CM

## 2017-12-29 DIAGNOSIS — M25.511 RIGHT SHOULDER PAIN, UNSPECIFIED CHRONICITY: ICD-10-CM

## 2017-12-29 PROCEDURE — G8991 OTHER PT/OT GOAL STATUS: HCPCS | Mod: GP | Performed by: PHYSICAL THERAPIST

## 2017-12-29 PROCEDURE — 97112 NEUROMUSCULAR REEDUCATION: CPT | Mod: GP | Performed by: PHYSICAL THERAPIST

## 2017-12-29 PROCEDURE — G8990 OTHER PT/OT CURRENT STATUS: HCPCS | Mod: GP | Performed by: PHYSICAL THERAPIST

## 2017-12-29 PROCEDURE — 97140 MANUAL THERAPY 1/> REGIONS: CPT | Mod: GP | Performed by: PHYSICAL THERAPIST

## 2017-12-29 PROCEDURE — 97110 THERAPEUTIC EXERCISES: CPT | Mod: GP | Performed by: PHYSICAL THERAPIST

## 2017-12-29 NOTE — PROGRESS NOTES
PROGRESS  REPORT    Subjective:  HPI  Patient returns to PT follow-up after last visit in September 28, 2017. Patient reports that the shoulder pain has been staying about the same since last visit. She has been doing the exercises she got at her initial evaluation periodically, though they cause her some pain. With the arm at rest by her side, she has 0/10 shoulder pain. With use (especially reaching) with the arm, she gets up to 4/10 shoulder pain. She is using icy hot rub at home for pain relief. Pain continues to be over lateral deltoid region, intermittently spreading as far down as the elbow. Pt's daughter is present today to translate                  Objective:    SHOULDER:    Posture: Forward head, rounded shoulders posture. Patient struggles to correct posture even with verbal and tactile cuing from PT partially due to language barrier.    Shoulder AROM  Flexion:  R 108*, increased to 117 following MT  Abduction: R 86*, increased to 109 following MT  External Rotation: R hand to buttock  Internal Rotation: R hand to back of head    System    Physical Exam    General     ROS    Assessment/Plan:      Updated problem list and treatment plan: Diagnosis 1:  Right shoulder impingement  Pain -  hot/cold therapy, electric stimulation, manual therapy, self management, education and home program  Decreased ROM/flexibility - manual therapy, therapeutic exercise, therapeutic activity and home program  Decreased strength - therapeutic exercise, therapeutic activities and home program  STG/LTGs have been met or progress has been made towards goals:  No  Assessment of Progress: The patient's condition is unchanged.  Self Management Plans:  Patient has been instructed in a home treatment program.  I have re-evaluated this patient and find that the nature, scope, duration and intensity of the therapy is appropriate for the medical condition of the patient.  Roya continues to require the following intervention to meet STG  and LTG's:  PT    Recommendations:  This patient would benefit from continued therapy.     Frequency:  1 X week, once daily  Duration:  for 6 weeks          Please refer to the daily flowsheet for treatment today, total treatment time and time spent performing 1:1 timed codes.

## 2017-12-29 NOTE — MR AVS SNAPSHOT
After Visit Summary   12/29/2017    Roya Feliciano    MRN: [id number]           Patient Information     Date Of Birth          1970        Visit Information        Provider Department      12/29/2017 11:40 AM Rita Webb, PT [facility] [place]        Today's Diagnoses     Right shoulder pain, unspecified chronicity        Cervicalgia           Follow-ups after your visit        Your next 10 appointments already scheduled     Jan 12, 2018  4:10 PM CST   T[facility] Spine with Rita Webb PT   [facility] [place] [facility] ([facility] [place])    [address]  [address]  [place] [address]   [phone]            Jan 19, 2018 12:50 PM CST   TOVA Spine with Katelyn Velasquez PT   [facility] [place] [facility] ([facility] [place])    [address]  [place] [address]   [phone]              Who to contact     If you have questions or need follow up information about today's clinic visit or your schedule please contact [facility] [place] directly at [phone].  Normal or non-critical lab and imaging results will be communicated to you by [employer], letter or phone within 4 business days after the clinic has received the results. If you do not hear from us within 7 days, please contact the clinic through [employer] or phone. If you have a critical or abnormal lab result, we will notify you by phone as soon as possible.  Submit refill requests through [employer] or call your pharmacy and they will forward the refill request to us. Please allow 3 business days for your refill to be completed.          Additional Information About Your Visit        MyChart Information     [employer] gives you secure access to your electronic health record. If you see a primary care provider, you can also send messages to your care team and make appointments. If you have questions, please call your primary care clinic.  If  you do not have a primary care provider, please call [phone] and they will assist you.        Care EveryWhere ID     This is your Care EveryWhere ID. This could be used by other organizations to access your [facility] medical records  [id number]         Blood Pressure from Last 3 Encounters:   09/07/17 126/82   08/30/17 126/82   08/21/17 119/75    Weight from Last 3 Encounters:   09/07/17 62.6 kg (138 lb)   08/30/17 62.6 kg (138 lb)   08/21/17 61.4 kg (135 lb 6.4 oz)              We Performed the Following     MANUAL THER TECH,1+REGIONS,EA 15 MIN     NEUROMUSCULAR RE-EDUCATION     THERAPEUTIC EXERCISES        Primary Care Provider Office Phone # Fax #    Wanda Rothman PA-C [phone] [address] [place] [address]        Equal Access to Services     [facility]: Hadii aad ku hadasho Soomaali, waaxda luqadaha, qaybta kaalmada adeegyada, waxay idiin hayaan mamadou emmanuel . So [facility] [phone].    ATENCIÓN: Si habla español, tiene a jaeger disposición servicios gratuitos de asistencia lingüística. Vir[facility] al [phone].    We comply with applicable federal civil rights laws and [place] laws. We do not discriminate on the basis of race, color, national origin, age, disability, sex, sexual orientation, or gender identity.            Thank you!     Thank you for choosing [facility]  for your care. Our goal is always to provide you with excellent care. Hearing back from our patients is one way we can continue to improve our services. Please take a few minutes to complete the written survey that you may receive in the mail after your visit with us. Thank you!             Your Updated Medication List - Protect others around you: Learn how to safely use, store and throw away your medicines at www.disposemymeds.org.          This list is accurate as of: 12/29/17  1:23 PM.  Always use your most recent med list.                   Brand Name Dispense  Instructions for use Diagnosis    cetirizine 10 MG tablet    zyrTEC    90 tablet    Take 1 tablet (10 mg) by mouth every evening    Environmental allergies       cyclobenzaprine 10 MG tablet    FLEXERIL    30 tablet    Take 0.5-1 tablets (5-10 mg) by mouth 3 times daily as needed for muscle spasms (and tightness)    Brachial neuritis or radiculitis       fluticasone 50 MCG/ACT spray    FLONASE    16 g    Spray 1-2 sprays into both nostrils daily    Seasonal allergic rhinitis

## 2018-01-12 ENCOUNTER — THERAPY VISIT (OUTPATIENT)
Dept: PHYSICAL THERAPY | Facility: CLINIC | Age: 48
End: 2018-01-12
Payer: COMMERCIAL

## 2018-01-12 DIAGNOSIS — M25.511 RIGHT SHOULDER PAIN, UNSPECIFIED CHRONICITY: ICD-10-CM

## 2018-01-12 DIAGNOSIS — M54.2 CERVICALGIA: ICD-10-CM

## 2018-01-12 PROCEDURE — 97112 NEUROMUSCULAR REEDUCATION: CPT | Mod: GP | Performed by: PHYSICAL THERAPIST

## 2018-01-12 PROCEDURE — 97110 THERAPEUTIC EXERCISES: CPT | Mod: GP | Performed by: PHYSICAL THERAPIST

## 2018-01-26 ENCOUNTER — THERAPY VISIT (OUTPATIENT)
Dept: PHYSICAL THERAPY | Facility: CLINIC | Age: 48
End: 2018-01-26
Payer: COMMERCIAL

## 2018-01-26 DIAGNOSIS — M25.511 RIGHT SHOULDER PAIN, UNSPECIFIED CHRONICITY: ICD-10-CM

## 2018-01-26 DIAGNOSIS — M54.2 CERVICALGIA: ICD-10-CM

## 2018-01-26 PROCEDURE — 97110 THERAPEUTIC EXERCISES: CPT | Mod: GP | Performed by: PHYSICAL THERAPIST

## 2018-01-26 PROCEDURE — 97140 MANUAL THERAPY 1/> REGIONS: CPT | Mod: GP | Performed by: PHYSICAL THERAPIST

## 2018-02-09 ENCOUNTER — THERAPY VISIT (OUTPATIENT)
Dept: PHYSICAL THERAPY | Facility: CLINIC | Age: 48
End: 2018-02-09
Payer: COMMERCIAL

## 2018-02-09 DIAGNOSIS — M54.2 CERVICALGIA: ICD-10-CM

## 2018-02-09 DIAGNOSIS — M25.511 RIGHT SHOULDER PAIN, UNSPECIFIED CHRONICITY: ICD-10-CM

## 2018-02-09 PROCEDURE — 97140 MANUAL THERAPY 1/> REGIONS: CPT | Mod: GP | Performed by: PHYSICAL THERAPIST

## 2018-02-09 PROCEDURE — 97112 NEUROMUSCULAR REEDUCATION: CPT | Mod: GP | Performed by: PHYSICAL THERAPIST

## 2018-02-09 PROCEDURE — 97110 THERAPEUTIC EXERCISES: CPT | Mod: GP | Performed by: PHYSICAL THERAPIST

## 2019-03-08 ENCOUNTER — OFFICE VISIT (OUTPATIENT)
Dept: FAMILY MEDICINE | Facility: CLINIC | Age: 49
End: 2019-03-08
Payer: COMMERCIAL

## 2019-03-08 VITALS
HEIGHT: 65 IN | BODY MASS INDEX: 23.89 KG/M2 | DIASTOLIC BLOOD PRESSURE: 80 MMHG | SYSTOLIC BLOOD PRESSURE: 138 MMHG | WEIGHT: 143.4 LBS | HEART RATE: 109 BPM | TEMPERATURE: 98.6 F | OXYGEN SATURATION: 100 % | RESPIRATION RATE: 16 BRPM

## 2019-03-08 DIAGNOSIS — R10.84 ABDOMINAL PAIN, GENERALIZED: ICD-10-CM

## 2019-03-08 DIAGNOSIS — E05.90 SUBCLINICAL HYPERTHYROIDISM: ICD-10-CM

## 2019-03-08 DIAGNOSIS — E04.1 THYROID NODULE: ICD-10-CM

## 2019-03-08 DIAGNOSIS — Z00.01 ENCOUNTER FOR ROUTINE ADULT MEDICAL EXAM WITH ABNORMAL FINDINGS: Primary | ICD-10-CM

## 2019-03-08 PROCEDURE — G0145 SCR C/V CYTO,THINLAYER,RESCR: HCPCS | Performed by: PHYSICIAN ASSISTANT

## 2019-03-08 PROCEDURE — 87624 HPV HI-RISK TYP POOLED RSLT: CPT | Performed by: PHYSICIAN ASSISTANT

## 2019-03-08 PROCEDURE — 99213 OFFICE O/P EST LOW 20 MIN: CPT | Mod: 25 | Performed by: PHYSICIAN ASSISTANT

## 2019-03-08 PROCEDURE — 99396 PREV VISIT EST AGE 40-64: CPT | Performed by: PHYSICIAN ASSISTANT

## 2019-03-08 ASSESSMENT — MIFFLIN-ST. JEOR: SCORE: 1272.59

## 2019-03-08 NOTE — PROGRESS NOTES
SUBJECTIVE:   CC: Roya Feliciano is an 48 year old woman who presents for preventive health visit.     Healthy Habits:    Do you get at least three servings of calcium containing foods daily (dairy, green leafy vegetables, etc.)? no    Amount of exercise or daily activities, outside of work: 0 day(s) per week    Problems taking medications regularly No    Medication side effects: No    Have you had an eye exam in the past two years? no    Do you see a dentist twice per year? yes    Do you have sleep apnea, excessive snoring or daytime drowsiness?no    Patient/Parent of patient informed that anything we discuss that is not related to preventative medicine, may be billed for; patient verbalizes understanding.    Concern(s):  1. Abdominal pressure/stomach pain started 1-2 months   intermittent, gassy  BMs regular  No blood in stool or fevers       Today's PHQ-2 Score:   PHQ-2 ( 1999 [employer]) 3/8/2019 8/17/2017   Q1: Little interest or pleasure in doing things 0 0   Q2: Feeling down, depressed or hopeless 0 0   PHQ-2 Score 0 0       Abuse: Current or Past(Physical, Sexual or Emotional)- No  Do you feel safe in your environment? Yes    Social History     Tobacco Use     Smoking status: Never Smoker     Smokeless tobacco: Never Used   Substance Use Topics     Alcohol use: No     If you drink alcohol do you typically have >3 drinks per day or >7 drinks per week? No                     Reviewed orders with patient.  Reviewed health maintenance and updated orders accordingly - Yes  Labs reviewed in [facility]    Mammogram Screening: Patient under age 50, mutual decision reflected in health maintenance.      Pertinent mammograms are reviewed under the imaging tab.  History of abnormal Pap smear: NO - age 30-65 PAP every 5 years with negative HPV co-testing recommended  PAP / HPV 5/20/2016 5/29/2009   PAP NIL NIL     Reviewed and updated as needed this visit by clinical staff  Tobacco  Allergies  Meds  Med Hx  Surg Hx  Fam Hx  " Soc Hx        Reviewed and updated as needed this visit by Provider        Past Medical History:   Diagnosis Date     Anemia      Latent tuberculosis infection         ROS:  Other than what is noted in the HPI and PMH a complete review of systems is otherwise negative including: Constitutional, HEENT, endocrine, cardiovascular, respiratory, GI/, musculoskeletal, neuro, and psychiatric.     OBJECTIVE:   /80   Pulse 109   Temp 98.6  F (37  C) (Oral)   Resp 16   Ht 1.645 m (5' 4.76\")   Wt 65 kg (143 lb 6.4 oz)   LMP 02/28/2019 (Approximate)   SpO2 100%   Breastfeeding? No   BMI 24.04 kg/m    EXAM:  GENERAL: healthy, alert and no distress  EYES: Eyes grossly normal to inspection, PERRL and conjunctivae and sclerae normal  HENT: ear canals and TM's normal, nose and mouth without ulcers or lesions  NECK: no adenopathy, no asymmetry, masses, or scars and thyroid normal to palpation  RESP: lungs clear to auscultation - no rales, rhonchi or wheezes  BREAST: normal without masses, tenderness or nipple discharge and no palpable axillary masses or adenopathy  CV: regular rate and rhythm, normal S1 S2, no S3 or S4, no murmur  ABDOMEN: soft, nontender, no hepatosplenomegaly, no masses and bowel sounds normal   (female): normal female external genitalia, normal urethral meatus, vaginal mucosa pink, moist, well rugated, and normal cervix  MS: no gross musculoskeletal defects noted, no edema  SKIN: no suspicious lesions or rashes  NEURO: Normal strength and tone, mentation intact and speech normal  PSYCH: mentation appears normal, affect normal/bright    ASSESSMENT/PLAN:       ICD-10-CM    1. Encounter for routine adult medical exam with abnormal findings Z00.01 Lipid panel reflex to direct LDL Fasting     GLUCOSE     Pap imaged thin layer screen with HPV - recommended age 30 - 65 years (select HPV order below)     HPV High Risk Types DNA Cervical     *MA Screening Digital Bilateral     OPTOMETRY REFERRAL   2. " "Thyroid nodule E04.1 TSH with free T4 reflex   3. Subclinical hyperthyroidism E05.90 TSH with free T4 reflex   4. Abdominal pain, generalized R10.84        1) Screenings discussed, pap today.    2,3) Will obtain repeat thyroid US and TSH.    4) Intermittent abdominal pain, mild - likely IBS related. If this worsens or becomes more frequent she'll follow up.      COUNSELING:   Reviewed preventive health counseling, as reflected in patient instructions    BP Readings from Last 1 Encounters:   03/08/19 138/80     Estimated body mass index is 24.04 kg/m  as calculated from the following:    Height as of this encounter: 1.645 m (5' 4.76\").    Weight as of this encounter: 65 kg (143 lb 6.4 oz).     reports that  has never smoked. she has never used smokeless tobacco.      Counseling Resources:  ATP IV Guidelines  Pooled Cohorts Equation Calculator  Breast Cancer Risk Calculator  FRAX Risk Assessment  ICSI Preventive Guidelines  Dietary Guidelines for Americans, 2010  USDA's MyPlate  ASA Prophylaxis  Lung CA Screening    Wanda Rothman PA-C  [facility] [place]  "

## 2019-03-08 NOTE — LETTER
March 15, 2019    Roya Feliciano  [address] [facility]  [place] [id number]    Dear ,  This letter is regarding your recent Pap smear (cervical cancer screening) and Human Papillomavirus (HPV) test.  We are happy to inform you that your Pap smear result is normal. Cervical cancer is closely linked with certain types of HPV. Your results showed no evidence of high-risk HPV.  Therefore we recommend you return in 5 years for your next pap smear and HPV test.  You will still need to return to the clinic every year for an annual exam and other preventive tests.  If you have additional questions regarding this result, please call our registered nurse, Estelita at [phone].  Sincerely,    Wanda Rothman PA-C/clara

## 2019-03-08 NOTE — PATIENT INSTRUCTIONS
SCHEDULE YOUR THYROID ULTRASOUND  Start a calcium/vitamin D supplement every day for bone health.    Average resting heart rate:  beats per minute    Look up some home exercises for strengthening the quadriceps and hamstrings.       Preventive Health Recommendations  Female Ages 40 to 49    Yearly exam:     See your health care provider every year in order to  1. Review health changes.   2. Discuss preventive care.    3. Review your medicines if your doctor prescribed any.      Get a Pap test every three years (unless you have an abnormal result and your provider advises testing more often).      If you get Pap tests with HPV test, you only need to test every 5 years, unless you have an abnormal result. You do not need a Pap test if your uterus was removed (hysterectomy) and you have not had cancer.      You should be tested each year for STDs (sexually transmitted diseases), if you're at risk.     Ask your doctor if you should have a mammogram.      Have a colonoscopy (test for colon cancer) if someone in your family has had colon cancer or polyps before age 50.       Have a cholesterol test every 5 years.       Have a diabetes test (fasting glucose) after age 45. If you are at risk for diabetes, you should have this test every 3 years.    Shots: Get a flu shot each year. Get a tetanus shot every 10 years.     Nutrition:     Eat at least 5 servings of fruits and vegetables each day.    Eat whole-grain bread, whole-wheat pasta and brown rice instead of white grains and rice.    Get adequate Calcium and Vitamin D.      Lifestyle    Exercise at least 150 minutes a week (an average of 30 minutes a day, 5 days a week). This will help you control your weight and prevent disease.    Limit alcohol to one drink per day.    No smoking.     Wear sunscreen to prevent skin cancer.    See your dentist every six months for an exam and cleaning.

## 2019-03-12 LAB
COPATH REPORT: NORMAL
PAP: NORMAL

## 2019-03-13 LAB
FINAL DIAGNOSIS: NORMAL
HPV HR 12 DNA CVX QL NAA+PROBE: NEGATIVE
HPV16 DNA SPEC QL NAA+PROBE: NEGATIVE
HPV18 DNA SPEC QL NAA+PROBE: NEGATIVE
SPECIMEN DESCRIPTION: NORMAL
SPECIMEN SOURCE CVX/VAG CYTO: NORMAL

## 2019-03-22 ENCOUNTER — ANCILLARY PROCEDURE (OUTPATIENT)
Dept: ULTRASOUND IMAGING | Facility: CLINIC | Age: 49
End: 2019-03-22
Attending: PHYSICIAN ASSISTANT
Payer: COMMERCIAL

## 2019-03-22 ENCOUNTER — HEALTH MAINTENANCE LETTER (OUTPATIENT)
Age: 49
End: 2019-03-22

## 2019-03-22 DIAGNOSIS — E04.1 THYROID NODULE: ICD-10-CM

## 2019-03-22 DIAGNOSIS — E05.90 SUBCLINICAL HYPERTHYROIDISM: ICD-10-CM

## 2019-03-22 DIAGNOSIS — E04.2 NON-TOXIC MULTINODULAR GOITER: ICD-10-CM

## 2019-03-22 DIAGNOSIS — Z00.01 ENCOUNTER FOR ROUTINE ADULT MEDICAL EXAM WITH ABNORMAL FINDINGS: ICD-10-CM

## 2019-03-22 LAB
CHOLEST SERPL-MCNC: 159 MG/DL
GLUCOSE SERPL-MCNC: 94 MG/DL (ref 70–99)
HDLC SERPL-MCNC: 68 MG/DL
LDLC SERPL CALC-MCNC: 80 MG/DL
NONHDLC SERPL-MCNC: 91 MG/DL
T4 FREE SERPL-MCNC: 1.08 NG/DL (ref 0.76–1.46)
TRIGL SERPL-MCNC: 56 MG/DL
TSH SERPL DL<=0.005 MIU/L-ACNC: 0.2 MU/L (ref 0.4–4)

## 2019-03-22 PROCEDURE — 82947 ASSAY GLUCOSE BLOOD QUANT: CPT | Performed by: PHYSICIAN ASSISTANT

## 2019-03-22 PROCEDURE — 80061 LIPID PANEL: CPT | Performed by: PHYSICIAN ASSISTANT

## 2019-03-22 PROCEDURE — 84443 ASSAY THYROID STIM HORMONE: CPT | Performed by: PHYSICIAN ASSISTANT

## 2019-03-22 PROCEDURE — 76536 US EXAM OF HEAD AND NECK: CPT

## 2019-03-22 PROCEDURE — 84439 ASSAY OF FREE THYROXINE: CPT | Performed by: PHYSICIAN ASSISTANT

## 2019-03-22 PROCEDURE — 36415 COLL VENOUS BLD VENIPUNCTURE: CPT | Performed by: PHYSICIAN ASSISTANT

## 2019-07-29 ENCOUNTER — ANCILLARY PROCEDURE (OUTPATIENT)
Dept: MAMMOGRAPHY | Facility: CLINIC | Age: 49
End: 2019-07-29
Attending: PHYSICIAN ASSISTANT
Payer: COMMERCIAL

## 2019-07-29 DIAGNOSIS — Z12.31 VISIT FOR SCREENING MAMMOGRAM: ICD-10-CM

## 2019-07-29 PROCEDURE — 77067 SCR MAMMO BI INCL CAD: CPT | Mod: TC

## 2020-02-23 ENCOUNTER — HEALTH MAINTENANCE LETTER (OUTPATIENT)
Age: 50
End: 2020-02-23

## 2020-12-13 ENCOUNTER — HEALTH MAINTENANCE LETTER (OUTPATIENT)
Age: 50
End: 2020-12-13

## 2021-04-17 ENCOUNTER — HEALTH MAINTENANCE LETTER (OUTPATIENT)
Age: 51
End: 2021-04-17

## 2021-05-29 ENCOUNTER — RECORDS - HEALTHEAST (OUTPATIENT)
Dept: ADMINISTRATIVE | Facility: CLINIC | Age: 51
End: 2021-05-29

## 2021-09-26 ENCOUNTER — HEALTH MAINTENANCE LETTER (OUTPATIENT)
Age: 51
End: 2021-09-26

## 2022-01-16 ENCOUNTER — HEALTH MAINTENANCE LETTER (OUTPATIENT)
Age: 52
End: 2022-01-16

## 2022-05-08 ENCOUNTER — HEALTH MAINTENANCE LETTER (OUTPATIENT)
Age: 52
End: 2022-05-08

## 2023-01-08 ENCOUNTER — HEALTH MAINTENANCE LETTER (OUTPATIENT)
Age: 53
End: 2023-01-08

## 2023-04-23 ENCOUNTER — HEALTH MAINTENANCE LETTER (OUTPATIENT)
Age: 53
End: 2023-04-23

## 2023-06-02 ENCOUNTER — HEALTH MAINTENANCE LETTER (OUTPATIENT)
Age: 53
End: 2023-06-02